# Patient Record
Sex: MALE | Race: WHITE
[De-identification: names, ages, dates, MRNs, and addresses within clinical notes are randomized per-mention and may not be internally consistent; named-entity substitution may affect disease eponyms.]

---

## 2021-11-17 ENCOUNTER — HOSPITAL ENCOUNTER (EMERGENCY)
Dept: HOSPITAL 94 - ER | Age: 31
LOS: 1 days | Discharge: PSYCHIATRIC OR SUBSTANCE USE FACILITY | End: 2021-11-18
Payer: SELF-PAY

## 2021-11-17 VITALS — HEIGHT: 77 IN | BODY MASS INDEX: 15.62 KG/M2 | WEIGHT: 132.28 LBS

## 2021-11-17 DIAGNOSIS — Z20.3: ICD-10-CM

## 2021-11-17 DIAGNOSIS — Z20.822: ICD-10-CM

## 2021-11-17 DIAGNOSIS — R46.0: Primary | ICD-10-CM

## 2021-11-17 LAB
ALBUMIN SERPL BCP-MCNC: 3.3 G/DL (ref 3.4–5)
ALBUMIN/GLOB SERPL: 0.7 {RATIO} (ref 1.1–1.5)
ALP SERPL-CCNC: 83 IU/L (ref 46–116)
ALT SERPL W P-5'-P-CCNC: 29 U/L (ref 12–78)
AMPHETAMINES UR QL SCN: NEGATIVE
ANION GAP SERPL CALCULATED.3IONS-SCNC: 11 MMOL/L (ref 8–16)
ANISOCYTOSIS BLD QL SMEAR: (no result)
AST SERPL W P-5'-P-CCNC: 22 U/L (ref 10–37)
BARBITURATES UR QL SCN: NEGATIVE
BASOPHILS # BLD AUTO: 0.1 X10'3 (ref 0–0.2)
BASOPHILS NFR BLD AUTO: 0.8 % (ref 0–1)
BENZODIAZ UR QL SCN: NEGATIVE
BILIRUB SERPL-MCNC: 0.8 MG/DL (ref 0.1–1)
BUN SERPL-MCNC: 15 MG/DL (ref 7–18)
BUN/CREAT SERPL: 19 (ref 5.4–32)
BZE UR QL SCN: NEGATIVE
CALCIUM SERPL-MCNC: 9 MG/DL (ref 8.5–10.1)
CANNABINOIDS UR QL SCN: POSITIVE
CHLORIDE SERPL-SCNC: 105 MMOL/L (ref 99–107)
CLARITY UR: CLEAR
CO2 SERPL-SCNC: 27.2 MMOL/L (ref 24–32)
COLOR UR: (no result)
CREAT SERPL-MCNC: 0.79 MG/DL (ref 0.6–1.1)
EOSINOPHIL # BLD AUTO: 0.7 X10'3 (ref 0–0.9)
EOSINOPHIL NFR BLD AUTO: 7.9 % (ref 0–6)
ERYTHROCYTE [DISTWIDTH] IN BLOOD BY AUTOMATED COUNT: 19.1 % (ref 11.5–14.5)
ETHANOL SERPL-MCNC: < 0.01 GM/DL (ref 0–0.01)
GFR SERPL CREATININE-BSD FRML MDRD: > 90 ML/MIN
GLUCOSE SERPL-MCNC: 81 MG/DL (ref 70–104)
GLUCOSE UR STRIP-MCNC: NEGATIVE MG/DL
HCT VFR BLD AUTO: 32.9 % (ref 42–52)
HGB BLD-MCNC: 11 G/DL (ref 14–17.9)
HGB UR QL STRIP: NEGATIVE
HYPOCHROMIA BLD QL SMEAR: (no result)
KETONES UR STRIP-MCNC: NEGATIVE MG/DL
LEUKOCYTE ESTERASE UR QL STRIP: NEGATIVE
LYMPHOCYTES # BLD AUTO: 1.4 X10'3 (ref 1.1–4.8)
LYMPHOCYTES NFR BLD AUTO: 15.3 % (ref 21–51)
MCH RBC QN AUTO: 25.9 PG (ref 27–31)
MCHC RBC AUTO-ENTMCNC: 33.4 G/DL (ref 33–36.5)
MCV RBC AUTO: 77.6 FL (ref 78–98)
METHADONE UR QL SCN: NEGATIVE
MICROCYTES BLD QL SMEAR: (no result)
MONOCYTES # BLD AUTO: 1.1 X10'3 (ref 0–0.9)
MONOCYTES NFR BLD AUTO: 11.5 % (ref 2–12)
NEUTROPHILS # BLD AUTO: 6 X10'3 (ref 1.8–7.7)
NEUTROPHILS NFR BLD AUTO: 64.5 % (ref 42–75)
NITRITE UR QL STRIP: NEGATIVE
OPIATES UR QL SCN: NEGATIVE
PCP UR QL SCN: NEGATIVE
PH UR STRIP: 6 [PH] (ref 4.8–8)
PLATELET # BLD AUTO: 437 X10'3 (ref 140–440)
PLATELET BLD QL SMEAR: NORMAL
PMV BLD AUTO: 7.6 FL (ref 7.4–10.4)
POLYCHROMASIA BLD QL SMEAR: (no result)
POTASSIUM SERPL-SCNC: 3.7 MMOL/L (ref 3.5–5.1)
PROT SERPL-MCNC: 8.2 G/DL (ref 6.4–8.2)
PROT UR QL STRIP: NEGATIVE MG/DL
RBC # BLD AUTO: 4.25 X10'6 (ref 4.7–6.1)
RBC MORPH BLD: (no result)
SODIUM SERPL-SCNC: 143 MMOL/L (ref 135–145)
SP GR UR STRIP: 1.03 (ref 1–1.03)
URN COLLECT METHOD CLASS: (no result)
UROBILINOGEN UR STRIP-MCNC: 0.2 E.U/DL (ref 0.2–1)
WBC # BLD AUTO: 9.3 X10'3 (ref 4.5–11)

## 2021-11-17 PROCEDURE — 84443 ASSAY THYROID STIM HORMONE: CPT

## 2021-11-17 PROCEDURE — 81003 URINALYSIS AUTO W/O SCOPE: CPT

## 2021-11-17 PROCEDURE — 80320 DRUG SCREEN QUANTALCOHOLS: CPT

## 2021-11-17 PROCEDURE — 70450 CT HEAD/BRAIN W/O DYE: CPT

## 2021-11-17 PROCEDURE — 80305 DRUG TEST PRSMV DIR OPT OBS: CPT

## 2021-11-17 PROCEDURE — 90715 TDAP VACCINE 7 YRS/> IM: CPT

## 2021-11-17 PROCEDURE — 85008 BL SMEAR W/O DIFF WBC COUNT: CPT

## 2021-11-17 PROCEDURE — 87635 SARS-COV-2 COVID-19 AMP PRB: CPT

## 2021-11-17 PROCEDURE — 80053 COMPREHEN METABOLIC PANEL: CPT

## 2021-11-17 PROCEDURE — 85025 COMPLETE CBC W/AUTO DIFF WBC: CPT

## 2021-11-17 PROCEDURE — 90471 IMMUNIZATION ADMIN: CPT

## 2021-11-17 PROCEDURE — 99285 EMERGENCY DEPT VISIT HI MDM: CPT

## 2021-11-17 PROCEDURE — 36415 COLL VENOUS BLD VENIPUNCTURE: CPT

## 2021-11-17 RX ADMIN — SULFAMETHOXAZOLE AND TRIMETHOPRIM SCH TAB: 800; 160 TABLET ORAL at 19:59

## 2021-11-17 RX ADMIN — SULFAMETHOXAZOLE AND TRIMETHOPRIM SCH TAB: 800; 160 TABLET ORAL at 18:03

## 2021-11-17 NOTE — NUR
One to one with the patient to assess severity of mental health symptoms. He 
has been sleeping on his bed. He gave very brief and guarded responses to 
questions. He denies that he has any kind of psychosis. When asked why he was 
here he stated he was yelling [facility] and when asked why he stated, 
"I felt like it" Minimal eye contact. He was asked several times what year it 
was he stated each time that it was Monday. He was unable to state what city he 
was in. He stated that he has been sleeping good. He is unable to verbalize a 
plan for food, shelter or clothing. He was made aware that he is on a 5150 
hold.

## 2021-11-17 NOTE — NUR
pt is stating no to everthing ,saying i don't know".asked what meds he takes 
and medical condition he has ?pt stated that i don't know.

## 2021-11-17 NOTE — NUR
Pt. continues to sleep, this writer evaluated a wound present on his left lower 
leg, and area appears to be reddened and open in the middle draining purulent 
drainage. Area cleaned and a dressing applied, will endorse to MD. Picture in 
pt's chart.

## 2021-11-17 NOTE — NUR
Dr. Cotton over to evauate wound, obtained orders for ABT Septra, Bactroban 
Ointment to wound, and Tetanus shot to be administered. Tetanus shot 
administered in the left deltoid and pt. tolerated well.

## 2021-11-17 NOTE — NUR
Assumed care of patient, pt. ambulated over from the main ER accompanied by two 
techs. He is requesting food and was given a snack. Pt. is sitting in bed at 
this time quietly, but appears to be whispering to himself and responding to 
internal stimuli. Will monitor. 

-------------------------------------------------------------------------------

Addendum: 11/17/21 at 1547 by COLLINS

-------------------------------------------------------------------------------

Minimal assessment completed r/t patient's psychosis, and A&O to name only. 
However, pt. will respond to direct questions with yes/no answers, and he 
denies any S/I, H/I, or A/V/HA. Pt. denies any home medications. He presents 
with fatigue and closes his eyes between questions.

## 2021-11-17 NOTE — NUR
pt came back to room after getting shower and antilice treatment .pt head is 
shaved and shower given by farzana solis and ashish as per dr jacob's verbal 
orders.

## 2021-11-18 VITALS — SYSTOLIC BLOOD PRESSURE: 122 MMHG | DIASTOLIC BLOOD PRESSURE: 80 MMHG

## 2021-11-18 RX ADMIN — SULFAMETHOXAZOLE AND TRIMETHOPRIM SCH TAB: 800; 160 TABLET ORAL at 08:03

## 2021-11-18 NOTE — NUR
The patient is awake and mumbling to himself. He is responding to internal 
stimuli and making bizarre delusional statements that are very tangential.

## 2021-11-18 NOTE — NUR
Assumed care, patient is awake in bed, hyperverbal talking to himself, "I am 
Lizabeth Rosario.  [place] is in nuclear war."  Speech is disorganized 
and patient appears to be responding to internal stimuli.  When speaking to 
staff patient appears more linear and is able to make needs know, "May I have 
some coffee please?"  No s/sx acute distress.

## 2021-11-18 NOTE — NUR
Appears aggitated, yelling out bizzar statements, "My name is the state 
police!" swearing and shaking head hack and forth and bouncing up and down in 
bed.  Verbally redirected and is quieter although still hyperverbal and making 
bizzar statements.

## 2021-11-18 NOTE — NUR
Continues responding to internal stuimuli.  Hyper verbal making bizzar 
statements such as "My name is the deligation of [place].  We are all dead. Just 
making sure.  My name is the deligation of [place].  We are all dead."  Refuses 
PRN Ativan for increased anxiety/ agitation but states, "I'll take it if I have 
to."  Noted to be quieter when verbally redirected.

## 2021-11-18 NOTE — NUR
THe patient is sitting on his bed talking very rapidly saying things like "my 
name is Jenanie Patel" and making racial slurs.

## 2021-11-18 NOTE — NUR
Patient continues to talk to himself. Ambulates independently to the restroom.  
Refuses PRN Ativan for increased agitation but can be verbally redirected not 
to yell and will continue talking urgently to himself, "This a sex slave, 
anything he does is death." and other bizzar statements